# Patient Record
(demographics unavailable — no encounter records)

---

## 2025-02-14 NOTE — ASSESSMENT
[FreeTextEntry1] : 24 year old female with PMH of pituitary disease is presenting for prolactinoma.   1. Prolactinoma, 2 Microadenomas  -MRI pituitary shows 2 small pituitary adenomas  -Patient has regular periods and denies galactorrhea, visual disturbances.  -Pt wishes to avoid taking Cabergoline 1/2 tablet once per week due to fatigue. We discussed bromocriptine as well.  -Check TSH with next set of labs.  -Repeat Pituitary MRI Aug 2025  -Discussed risk of increase in microadenomas.   Patient verbalized understanding of the above. All questions were answered to patient's satisfaction. Dispo: Patient will follow up in 6 months.

## 2025-02-14 NOTE — HISTORY OF PRESENT ILLNESS
[FreeTextEntry1] : Ms. Guillory is presenting for prolactinoma.   24 year old female with PMH of pituitary disease, PCOS.  #Prolactinoma  Aug 2023 Prolactin 96.3, TSH 3.09  Pt started on Cabergoline 1/2 tablet twice per week Aug 2023  Oct 2023 Prolactin 2.7  Pt not on any birth control, not TTC at this time. Tried OCP for 1 year (stopped it 2022) MRI pituitary Sept 2023: The pituitary gland measures approximately 6.2 mm in maximum height which is within normal. The pituitary stalk is midline and appears unremarkable. This finding measures approximately 2.4 mm and could be compatible with a small pituitary microadenoma though the possibility of a Rathke's cleft cyst or pars intermedius cyst must be considered. MRI pituitary Aug 2024: This finding measures approximately 2.7 mm was present on prior study. There is now evidence of an area of decreased enhancement involving the right pituitary gland. This best seen on series 12 image 5 and measures approximately 4.5 x 4.8 mm. This could be compatible with a pituitary microadenoma as well.  Pt denies use of anti psychotics (ie risperdone), antiemetics or opioids. She denies pregnancy, stress, head trauma, renal failure. No h/o thyroid disease. ROS negative for galactorrhea, visual disturbances, oligomenorrhea, amenorrhea, chest wall injury.   #Elevated IGF-1 Glucose tolerance test showed appropriate decrease in glucose at 30, 60, 90 and 120 minutes. Acromegaly ruled out.   Interval hx:  Pt had period irregularity occurring every 35 to 40 days. Off of cabergoline since Aug 2024 she feels fatigued 2 days later. Discussed bromocriptine as an option.  Denies galactorrhea.

## 2025-02-14 NOTE — PHYSICAL EXAM
[Alert] : alert [Well Nourished] : well nourished [No Acute Distress] : no acute distress [EOMI] : extra ocular movement intact [Normal Hearing] : hearing was normal [No Neck Mass] : no neck mass was observed [Thyroid Not Enlarged] : the thyroid was not enlarged [No Respiratory Distress] : no respiratory distress [Normal Rate] : heart rate was normal [Normal Gait] : normal gait [Oriented x3] : oriented to person, place, and time [Normal Affect] : the affect was normal [Normal Mood] : the mood was normal

## 2025-05-23 NOTE — PLAN
[FreeTextEntry1] : 24-year-old for physical exam. Pap per GYN. Routine labs ordered today.   Prolactinoma. Cabergoline per endo.  All questions answered. Patient voiced understanding and in agreement to plan. Return to clinic as recommended.

## 2025-05-23 NOTE — HISTORY OF PRESENT ILLNESS
[FreeTextEntry1] : CPE [de-identified] : 24-year-old female for physical exam.  History of prolactinoma on cabergoline.

## 2025-07-18 NOTE — HISTORY OF PRESENT ILLNESS
[FreeTextEntry1] : 25 yo female LMP 6/20/25 for hyperprolactinemia and gyn annual  Menses 32-38 days x 5 days Pain only rarely on first day. No NSAIDs. Hirsutism of chin managed by pt. No spironolactone.  Reviewed hyperprolactinemia consult Dr Charlee Calhoun, endocrinologist at [facility]. Recent Prl 39.4 on 3/3/1/25 but monomeric prolactin 17.6 (3.2-25). Pt has not restarted cabergoline nor bromocriptine. Plan is to repeat levels in 9/25. LH/FSH levels 8/4 with normal estradiol 30.3. Prior US pelvis do NOT show classic PCOS with large follicle count. NO treatment for PCOS started. Pt is comfortable with her menses. We discussed ovulation and she is not able to detect ovulation in most months.  ~~~  Last visit: Pt  saw endocrinologist 11/29/23 and 4/24 who agreed with continuing the Bromocriptine 1/2 tablet. She stated that it may need to be increased to BIW again. Currently, her menses are 31-34 days.   Last visit  Recent menses:  6 days with first and last day spotting, Menses now 32 days apart.  using ibuprofen 600 mg only as needed and rarely Not sexually active. now.  Last visit. Since February menses have been 35-37 days and lasting 4 days. Pt had been amenorrheic prior with pain.  9/1/23 MRI of pituitary gland was 6.2 mm with 2.3 mm microadenoma.  8/28/24 two microadenomas 2.7mm and 4.5 x 4.8cm second lesion.  GYN: not sexually active No Gardasil vaccine  SH: Speech path, Will start [employer] [facility]  [PapSmeardate] : 7/12/24 [TextBox_31] : Pap and HPV neg

## 2025-07-18 NOTE — PHYSICAL EXAM
[Appropriately responsive] : appropriately responsive [Alert] : alert [No Acute Distress] : no acute distress [No Lymphadenopathy] : no lymphadenopathy [Soft] : soft [Non-tender] : non-tender [Non-distended] : non-distended [No HSM] : No HSM [No Lesions] : no lesions [No Mass] : no mass [Oriented x3] : oriented x3 [FreeTextEntry2] : chin hirsutism noted [FreeTextEntry6] : No nipple discharge. No coarse hairs about areola [Examination Of The Breasts] : a normal appearance [No Masses] : no breast masses were palpable [Labia Majora] : normal [Labia Minora] : normal [Normal] : normal [Uterine Adnexae] : normal

## 2025-07-18 NOTE — DISCUSSION/SUMMARY
[FreeTextEntry1] : Health Maintenance: discussed safe sexual practices Pap and HPV last year  PCOS: Pt does not meet criteria for PCOS. She has irregular periods and hirsutism only. AIXA in past have led to mood changes. NO need for contraception. Discussed how AIXA pills will decrease testosterone. Like Dr Calhoun, I discussed spironolactone for hirsutism and acne. No acne present. Will defer.  Pituitary Adenoma: Despite normal vision, microadenoma should be reimaged. I recommend cabergoline to suppress prolactin as it may regular her cycles. This may become more meaningful when she is trying to conceive. (not currently) Will defer to endocrine recommendations.